# Patient Record
Sex: FEMALE | Race: WHITE | HISPANIC OR LATINO | Employment: UNEMPLOYED | URBAN - NONMETROPOLITAN AREA
[De-identification: names, ages, dates, MRNs, and addresses within clinical notes are randomized per-mention and may not be internally consistent; named-entity substitution may affect disease eponyms.]

---

## 2023-12-29 ENCOUNTER — OFFICE VISIT (OUTPATIENT)
Dept: OBSTETRICS AND GYNECOLOGY | Facility: CLINIC | Age: 28
End: 2023-12-29

## 2023-12-29 VITALS
WEIGHT: 163 LBS | DIASTOLIC BLOOD PRESSURE: 70 MMHG | HEIGHT: 61 IN | BODY MASS INDEX: 30.78 KG/M2 | SYSTOLIC BLOOD PRESSURE: 110 MMHG

## 2023-12-29 DIAGNOSIS — Z3A.01 LESS THAN 8 WEEKS GESTATION OF PREGNANCY: Primary | ICD-10-CM

## 2023-12-29 RX ORDER — PRENATAL VIT/IRON FUM/FOLIC AC 27MG-0.8MG
TABLET ORAL DAILY
COMMUNITY

## 2023-12-29 NOTE — PROGRESS NOTES
"Chief Complaint   Patient presents with    Pregnancy Ultrasound     New pt. US today, no fetal pole seen yet. No c/o.        History:  Melinda Savage is a 28 y.o. female who presents today for follow-up for evaluation of the above:  Pt is seen today due to possibility of early pregnancy. Had ultrasound today.         ROS:  Review of Systems   Constitutional:  Negative for activity change and unexpected weight loss.   Cardiovascular:  Negative for chest pain.   Gastrointestinal:  Negative for blood in stool, constipation and diarrhea.   Endocrine: Negative for cold intolerance and heat intolerance.   Genitourinary:  Negative for dyspareunia, pelvic pain and vaginal discharge.   Musculoskeletal:  Positive for arthralgias. Negative for back pain, neck pain and neck stiffness.   Skin:  Negative for rash.   Neurological:  Negative for dizziness and headache.   Psychiatric/Behavioral:  Negative for sleep disturbance. The patient is not nervous/anxious.        Ms. Savage  reports that she has never smoked. She has never used smokeless tobacco. She reports that she does not drink alcohol and does not use drugs.      Current Outpatient Medications:     Prenatal Vit-Fe Fumarate-FA (prenatal vitamin 27-0.8) 27-0.8 MG tablet tablet, Take  by mouth Daily., Disp: , Rfl:       OBJECTIVE:  /70 (BP Location: Right arm, Patient Position: Sitting)   Ht 154.9 cm (61\")   Wt 73.9 kg (163 lb)   LMP 10/30/2023 (Exact Date)   BMI 30.80 kg/m²    Physical Exam  Vitals and nursing note reviewed.   Constitutional:       Appearance: She is well-developed.   HENT:      Head: Normocephalic and atraumatic.   Eyes:      General:         Right eye: No discharge.         Left eye: No discharge.      Conjunctiva/sclera: Conjunctivae normal.   Neck:      Thyroid: No thyromegaly.   Cardiovascular:      Rate and Rhythm: Normal rate and regular rhythm.      Heart sounds: Normal heart sounds. No murmur heard.  Pulmonary:      Effort: " Pulmonary effort is normal.      Breath sounds: Normal breath sounds.   Abdominal:      Palpations: Abdomen is soft.      Tenderness: There is no right CVA tenderness or left CVA tenderness.   Musculoskeletal:      Cervical back: Normal range of motion and neck supple.   Skin:     General: Skin is warm and dry.   Neurological:      Mental Status: She is alert and oriented to person, place, and time.   Psychiatric:         Mood and Affect: Mood normal.         Behavior: Behavior normal.         Thought Content: Thought content normal.         Judgment: Judgment normal.         Assessment/Plan    Diagnoses and all orders for this visit:    1. Less than 8 weeks gestation of pregnancy (Primary)    Pt came in today for ultrasound for pregnancy dates. She states that last period was 10/30. Ultrasound today shows gestational sac with yolk sac but no fetal pole. Pt denies pain or bleeding. Only complaint is some aching of bilateral hips for last week or so. Educated on signs and symptoms of SAB and when to go to ER. Will plan to repeat ultrasound in 1 week.   Pt and  voice understanding.        An After Visit Summary was printed and given to the patient at discharge.  Return in about 1 week (around 1/5/2024) for New OB and ultrasound with Priscila or doctor. Sooner if problems arise.          EDUARDO Gomez  Electronically Signed

## 2024-01-04 ENCOUNTER — TELEPHONE (OUTPATIENT)
Dept: OBSTETRICS AND GYNECOLOGY | Age: 29
End: 2024-01-04

## 2024-01-04 NOTE — TELEPHONE ENCOUNTER
Caller: Melinda Savage    Relationship to patient: Self    Best call back number: [phone]    Chief complaint: NEW OB LMP 10-30-23    Type of visit: NEW OB    Requested date: SHE CANNOT MAKE IT TO 1-5 APPT. 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 ADV AFTER 3PM IS BEST FOR HER.

## 2024-01-05 ENCOUNTER — HOSPITAL ENCOUNTER (EMERGENCY)
Facility: HOSPITAL | Age: 29
Discharge: HOME | End: 2024-01-05

## 2024-01-05 VITALS
BODY MASS INDEX: 33.47 KG/M2 | RESPIRATION RATE: 16 BRPM | DIASTOLIC BLOOD PRESSURE: 68 MMHG | HEIGHT: 59 IN | SYSTOLIC BLOOD PRESSURE: 124 MMHG | TEMPERATURE: 98 F | WEIGHT: 166 LBS | OXYGEN SATURATION: 99 % | HEART RATE: 90 BPM

## 2024-01-05 DIAGNOSIS — J02.0 STREP PHARYNGITIS: Primary | ICD-10-CM

## 2024-01-05 LAB
FLUAV RNA RESP QL NAA+PROBE: NOT DETECTED
FLUBV RNA RESP QL NAA+PROBE: NOT DETECTED
S PYO AG THROAT QL: POSITIVE
SARS-COV-2 RNA RESP QL NAA+PROBE: NOT DETECTED

## 2024-01-05 PROCEDURE — 87880 STREP A ASSAY W/OPTIC: CPT

## 2024-01-05 PROCEDURE — 99283 EMERGENCY DEPT VISIT LOW MDM: CPT

## 2024-01-05 PROCEDURE — 87636 SARSCOV2 & INF A&B AMP PRB: CPT | Performed by: NURSE PRACTITIONER

## 2024-01-05 RX ORDER — AMOXICILLIN 875 MG/1
875 TABLET, COATED ORAL 2 TIMES DAILY
Qty: 20 TABLET | Refills: 0 | Status: SHIPPED | OUTPATIENT
Start: 2024-01-05 | End: 2024-01-15

## 2024-01-05 NOTE — ED NOTES
Pt discharge instructions done with interpretor phone. Pt states she understands. Questions answered.

## 2024-01-05 NOTE — DISCHARGE INSTRUCTIONS
Return to ER if symptoms worsen   Increase oral fluids   Return if increased pain, vomiting, fever

## 2024-01-05 NOTE — ED PROVIDER NOTES
"Subjective   History of Present Illness  Patient is a 28-year-old  female presents emergency department with sore throat and generalized bodyaches for the past 2 to 3 days.  No nausea or vomiting.  Patient is also 9 weeks pregnant.  She denies any abdominal pain or vaginal bleeding.  Patient does not speak English and  was used.    History provided by:  Patient   used: Yes        Review of Systems   Constitutional: Negative.    HENT:  Positive for sore throat.    Eyes: Negative.    Respiratory: Negative.     Cardiovascular: Negative.    Gastrointestinal: Negative.    Endocrine: Negative.    Genitourinary: Negative.    Musculoskeletal: Negative.    Skin: Negative.    Allergic/Immunologic: Negative.    Neurological: Negative.    Hematological: Negative.    Psychiatric/Behavioral: Negative.     All other systems reviewed and are negative.      Past Medical History:   Diagnosis Date    History of anemia        No Known Allergies    No past surgical history on file.    No family history on file.    Social History     Socioeconomic History    Marital status: Single   Tobacco Use    Smoking status: Never    Smokeless tobacco: Never   Vaping Use    Vaping Use: Never used   Substance and Sexual Activity    Alcohol use: Never    Drug use: Never    Sexual activity: Yes     Partners: Male     Birth control/protection: None       Prior to Admission medications    Medication Sig Start Date End Date Taking? Authorizing Provider   Prenatal Vit-Fe Fumarate-FA (prenatal vitamin 27-0.8) 27-0.8 MG tablet tablet Take  by mouth Daily.    Provider, Uma, MD       /68   Pulse 90   Temp 98 °F (36.7 °C)   Resp 16   Ht 150 cm (59.06\")   Wt 75.3 kg (166 lb)   LMP 10/30/2023 (Exact Date)   SpO2 99%   BMI 33.47 kg/m²     Objective   Physical Exam    Procedures         Lab Results (last 24 hours)       Procedure Component Value Units Date/Time    Rapid Strep A Screen - Swab, Throat " [[id number]]  (Abnormal) Collected: 01/05/24 1224    Specimen: Swab from Throat Updated: 01/05/24 1251     Strep A Ag Positive    COVID PRE-OP / PRE-PROCEDURE SCREENING ORDER (NO ISOLATION) - Swab, Nasopharynx [[id number]]  (Normal) Collected: 01/05/24 1224    Specimen: Swab from Nasopharynx Updated: 01/05/24 1323    Narrative:      The following orders were created for panel order COVID PRE-OP / PRE-PROCEDURE SCREENING ORDER (NO ISOLATION) - Swab, Nasopharynx.  Procedure                               Abnormality         Status                     ---------                               -----------         ------                     COVID-19 and FLU A/B PCR...[[id number]]  Normal              Final result                 Please view results for these tests on the individual orders.    COVID-19 and FLU A/B PCR, 1 HR TAT - Swab, Nasopharynx [[id number]]  (Normal) Collected: 01/05/24 1224    Specimen: Swab from Nasopharynx Updated: 01/05/24 1323     COVID19 Not Detected     Influenza A PCR Not Detected     Influenza B PCR Not Detected    Narrative:      Fact sheet for providers: https://www.fda.gov/media/074589/download    Fact sheet for patients: https://www.fda.gov/media/697711/download    Test performed by PCR.            No orders to display       ED Course  ED Course as of 01/05/24 1604   Fri Jan 05, 2024   1343 Patient covid-19 and influenza are negative.  Strep screen is positive.  I will place the patient on Augmentin.  The results of testing and treatment plan were explained to the patient with assistance from  on the  line.  Patient is in agreement with care plan and voices understanding of instructions.  She will be discharged shortly in stable condition. [CW]   1345 The patient has acute pharyngitis/tonsillitis. The patient was prescribed antibiotics today and a strep test or culture was performed today or within the last 3 days.    [CW]      ED Course User Index  [CW] Jordana Ramirez  EDUARDO Owen        Medical Decision Making  Patient is a 28-year-old  female presents emergency department with sore throat and generalized bodyaches for the past 2 to 3 days.  No nausea or vomiting.  Patient is also 9 weeks pregnant.  She denies any abdominal pain or vaginal bleeding.  Patient does not speak English and  was used.  Course of treatment in ED: Nontoxic-appearing.  No acute distress.  Oropharynx very erythematous no exudate.  No signs of peritonsillar abscess.  Airway intact.  Lungs are clear to auscultation.  CV normal sinus rhythm.  Strep screen, COVID, influenza have all been ordered.  Differential dx: influenza; covid; strep pharyngitis; viral illness  Labs Reviewed  RAPID STREP A SCREEN - Abnormal; Notable for the following components:     Strep A Ag                    Positive (*)            All other components within normal limits  COVID-19 AND FLU A/B, NP SWAB IN TRANSPORT MEDIA 1 HR TAT - Normal         Narrative: Fact sheet for providers: https://www.fda.gov/media/610548/download                                    Fact sheet for patients: https://www.fda.gov/media/375940/download                                    Test performed by PCR.  COVID PRE-OP / PRE-PROCEDURE SCREENING ORDER (NO ISOLATION)  Patient is positive for strep.  Will place on antibiotics.  Advised the patient to do warm salt water gargles 3 times a day.  Advised to return the emergency department if symptoms worsen.  Patient is in agreement with the care plan and voices understanding of instructions instructions were reviewed with the patient her  on the  line.    Problems Addressed:  Strep pharyngitis: complicated acute illness or injury    Amount and/or Complexity of Data Reviewed  Labs: ordered. Decision-making details documented in ED Course.    Risk  Prescription drug management.         Final diagnoses:   Strep pharyngitis          Jordana Ramirez APRN  01/05/24 9999

## 2024-01-10 ENCOUNTER — OFFICE VISIT (OUTPATIENT)
Dept: OBSTETRICS AND GYNECOLOGY | Facility: CLINIC | Age: 29
End: 2024-01-10
Payer: MEDICAID

## 2024-01-10 VITALS
BODY MASS INDEX: 29.83 KG/M2 | DIASTOLIC BLOOD PRESSURE: 76 MMHG | WEIGHT: 158 LBS | HEIGHT: 61 IN | SYSTOLIC BLOOD PRESSURE: 114 MMHG

## 2024-01-10 DIAGNOSIS — O02.0 ANEMBRYONIC PREGNANCY: Primary | ICD-10-CM

## 2024-01-10 DIAGNOSIS — R11.0 PREGNANCY RELATED NAUSEA, ANTEPARTUM: ICD-10-CM

## 2024-01-10 DIAGNOSIS — Z34.90 EARLY STAGE OF PREGNANCY: ICD-10-CM

## 2024-01-10 DIAGNOSIS — O26.899 PREGNANCY RELATED NAUSEA, ANTEPARTUM: ICD-10-CM

## 2024-01-10 DIAGNOSIS — Z78.9 TELEPHONE LANGUAGE INTERPRETER SERVICE REQUIRED: ICD-10-CM

## 2024-01-10 RX ORDER — ONDANSETRON 4 MG/1
4 TABLET, FILM COATED ORAL EVERY 8 HOURS PRN
Qty: 30 TABLET | Refills: 1 | Status: SHIPPED | OUTPATIENT
Start: 2024-01-10 | End: 2025-01-09

## 2024-01-10 NOTE — PROGRESS NOTES
"Audrey Savage is a 28 y.o. female    History of Present Illness  Patient arrived for a gyne visit with an ultrasound for determining viability of pregnancy. She was last seen in the office on 12/29/2023 with an ultrasound, which showed a gestational sac and yolk sac but no fetal pole. She denies vaginal bleeding or cramping at this time. She relates they have been trying to conceive for quite some time, and she was hoping this was the time. She became quite tearful during the visit.         /76   Ht 154.9 cm (61\")   Wt 71.7 kg (158 lb)   LMP 10/30/2023 (Exact Date)   BMI 29.85 kg/m²     Outpatient Encounter Medications as of 1/10/2024   Medication Sig Dispense Refill    amoxicillin (AMOXIL) 875 MG tablet Take 1 tablet by mouth 2 (Two) Times a Day for 10 days. 20 tablet 0    Prenatal Vit-Fe Fumarate-FA (prenatal vitamin 27-0.8) 27-0.8 MG tablet tablet Take  by mouth Daily.      ondansetron (Zofran) 4 MG tablet Take 1 tablet by mouth Every 8 (Eight) Hours As Needed for Nausea or Vomiting. 30 tablet 1     No facility-administered encounter medications on file as of 1/10/2024.       Surgical History  History reviewed. No pertinent surgical history.    Family History  History reviewed. No pertinent family history.    The following portions of the patient's history were reviewed and updated as appropriate: allergies, current medications, past family history, past medical history, past social history, past surgical history, and problem list.    Review of Systems   Constitutional:  Positive for fatigue. Negative for chills and fever.   HENT:  Positive for sore throat (resolving).    Respiratory:  Negative for chest tightness.    Cardiovascular:  Negative for chest pain.   Gastrointestinal:  Negative for abdominal pain.   Genitourinary:  Positive for amenorrhea and breast pain. Negative for difficulty urinating, dysuria, pelvic pain, pelvic pressure, vaginal bleeding and vaginal discharge. "   Musculoskeletal:  Positive for myalgias (tecent illness). Negative for back pain.   Skin:  Negative for pallor and rash.   Neurological:  Negative for dizziness, light-headedness and headache.   Hematological:  Negative for adenopathy.   Psychiatric/Behavioral:  Positive for dysphoric mood and depressed mood. Negative for self-injury and suicidal ideas. The patient is nervous/anxious.        Objective   Physical Exam  Vitals and nursing note reviewed.   Constitutional:       General: She is not in acute distress.     Appearance: Normal appearance. She is well-developed, well-groomed and overweight. She is not ill-appearing or diaphoretic.   HENT:      Head: Normocephalic and atraumatic.   Eyes:      General: Lids are normal. No scleral icterus.  Neck:      Trachea: Phonation normal.   Cardiovascular:      Rate and Rhythm: Normal rate and regular rhythm.      Heart sounds: Normal heart sounds. No murmur heard.  Pulmonary:      Effort: Pulmonary effort is normal. No accessory muscle usage or respiratory distress.      Breath sounds: Normal breath sounds.   Abdominal:      Palpations: Abdomen is soft.      Tenderness: There is no abdominal tenderness. There is no right CVA tenderness or left CVA tenderness.   Musculoskeletal:         General: No tenderness. Normal range of motion.      Cervical back: Normal range of motion and neck supple. No rigidity or tenderness. No pain with movement. Normal range of motion.      Right lower leg: No edema.      Left lower leg: No edema.   Lymphadenopathy:      Cervical: No cervical adenopathy.   Skin:     General: Skin is warm and dry.      Coloration: Skin is not cyanotic, jaundiced or pale.      Findings: No lesion or rash.   Neurological:      Mental Status: She is alert and oriented to person, place, and time.      Gait: Gait normal.   Psychiatric:         Attention and Perception: Attention normal.         Mood and Affect: Mood is depressed. Affect is tearful.          Speech: Speech normal.         Behavior: Behavior is cooperative.           TVUS today: Intrauterine gestational sac (22.8 mm) with a yolk sac but no fetal pole visualized. Estimated gestational age by gestational sac diameter is 7 weeks, 3 days. Normal-appearing ovaries.    TVUS 12/29/2023: Intrauterine gestational sac with yolk sac visualized. Gestational sac diameter of 22.8 mm for a possible estimated gestational age of 7 weeks, 2 days. No fetal pole noted. Normal-appearing ovaries.         Chart reviewed for screenings  Last Pap Smear: No pap smear on file. Will address screening at a later visit.   Last Mammogram: Screening to begin at age 40.  No family history of breast cancer noted.  Last Colonoscopy: Screening to begin at age 45.  No family history of colon cancer noted.  Last Bone Density Scan: Screening to begin by 5 years after the onset of menopause.        Assessment & Plan   Diagnoses and all orders for this visit:    1. Anembryonic pregnancy (Primary)  Ultrasound today and reviewed and compared with ultrasound of 12 days prior. Review of ultrasound and lack of fetal pole formation suggestive of early pregnancy loss. Ultrasound reviewed with patient. Support offered. Anembryonic Pregnancy and Miscarriage education included in the AVS.     Early pregnancy loss management options discussed in detail (expectant management, medical management, and surgical management), average time frames for loss to occur,  rates of loss without additional intervention for each, plans for follow-up, risks or complications (infection, bleeding, need for further interventions, damage to surrounding organs or tissue with surgery), and measures of support for discomforts associated with pregnancy loss (such as counseling or support for grief, medication for pain, heat). Explained she would have a consultation with a gynecologist if she would chose surgical intervention. She does not have to make a decision regarding  management at this time. She may go home, grieve the loss, consider the options, and then notify this provider when she is ready. Offered to contact someone to come and be with her, and she relates her  is on his way. She expressed understanding of the above. She would like to proceed with expectant management at this time. Labs today to include ABO/Rh in the event she may need Rhogam once she experiences vaginal bleeding. Encouraged to contact provider if she has concerns, including need for grief/mood support. Discussed also signs to report and reasons to come to the hospital, including heavy bleeding or unmanaged pelvic pain.  -     ABO / Rh  -     Antibody Screen  -     HCG, B-subunit, Quantitative  -     CBC & Differential    2. Early stage of pregnancy  -     ABO / Rh  -     Antibody Screen  -     HCG, B-subunit, Quantitative  -     CBC & Differential    3. Pregnancy related nausea, antepartum  Discussed measures of support for the nausea she is experiencing and a prescription for Zofran sent to the pharmacy.   -     ondansetron (Zofran) 4 MG tablet; Take 1 tablet by mouth Every 8 (Eight) Hours As Needed for Nausea or Vomiting.  Dispense: 30 tablet; Refill: 1    4. Telephone  service required  Telephone  services utilized with verbal consent of patient.          Return to the office in 1 week for a gyne visit with an ultrasound for surveillance of early pregnancy loss and as needed with concerns.        This note has been signed electronically.    Katherine Owen, DNP, APRN, CNM, RNC-OB  1/10/2024

## 2024-01-11 LAB
ABO GROUP BLD: NORMAL
BASOPHILS # BLD AUTO: 0.05 10*3/MM3 (ref 0–0.2)
BASOPHILS NFR BLD AUTO: 0.5 % (ref 0–1.5)
BLD GP AB SCN SERPL QL: NEGATIVE
EOSINOPHIL # BLD AUTO: 0.28 10*3/MM3 (ref 0–0.4)
EOSINOPHIL NFR BLD AUTO: 2.5 % (ref 0.3–6.2)
ERYTHROCYTE [DISTWIDTH] IN BLOOD BY AUTOMATED COUNT: 15 % (ref 12.3–15.4)
HCG INTACT+B SERPL-ACNC: NORMAL MIU/ML
HCT VFR BLD AUTO: 36.9 % (ref 34–46.6)
HGB BLD-MCNC: 12.2 G/DL (ref 12–15.9)
IMM GRANULOCYTES # BLD AUTO: 0.05 10*3/MM3 (ref 0–0.05)
IMM GRANULOCYTES NFR BLD AUTO: 0.5 % (ref 0–0.5)
LYMPHOCYTES # BLD AUTO: 2.17 10*3/MM3 (ref 0.7–3.1)
LYMPHOCYTES NFR BLD AUTO: 19.5 % (ref 19.6–45.3)
MCH RBC QN AUTO: 28.2 PG (ref 26.6–33)
MCHC RBC AUTO-ENTMCNC: 33.1 G/DL (ref 31.5–35.7)
MCV RBC AUTO: 85.2 FL (ref 79–97)
MONOCYTES # BLD AUTO: 0.41 10*3/MM3 (ref 0.1–0.9)
MONOCYTES NFR BLD AUTO: 3.7 % (ref 5–12)
NEUTROPHILS # BLD AUTO: 8.14 10*3/MM3 (ref 1.7–7)
NEUTROPHILS NFR BLD AUTO: 73.3 % (ref 42.7–76)
NRBC BLD AUTO-RTO: 0 /100 WBC (ref 0–0.2)
PLATELET # BLD AUTO: 284 10*3/MM3 (ref 140–450)
RBC # BLD AUTO: 4.33 10*6/MM3 (ref 3.77–5.28)
RH BLD: POSITIVE
WBC # BLD AUTO: 11.1 10*3/MM3 (ref 3.4–10.8)

## 2024-01-22 DIAGNOSIS — O03.9 SPONTANEOUS ABORTION: Primary | ICD-10-CM
